# Patient Record
Sex: FEMALE | Race: OTHER | HISPANIC OR LATINO | URBAN - METROPOLITAN AREA
[De-identification: names, ages, dates, MRNs, and addresses within clinical notes are randomized per-mention and may not be internally consistent; named-entity substitution may affect disease eponyms.]

---

## 2017-02-24 ENCOUNTER — OUTPATIENT (OUTPATIENT)
Dept: OUTPATIENT SERVICES | Facility: HOSPITAL | Age: 42
LOS: 1 days | End: 2017-02-24
Payer: MEDICAID

## 2017-02-24 VITALS
HEIGHT: 63 IN | OXYGEN SATURATION: 100 % | WEIGHT: 123.9 LBS | RESPIRATION RATE: 17 BRPM | HEART RATE: 70 BPM | TEMPERATURE: 98 F | SYSTOLIC BLOOD PRESSURE: 112 MMHG | DIASTOLIC BLOOD PRESSURE: 67 MMHG

## 2017-02-24 DIAGNOSIS — D36.7 BENIGN NEOPLASM OF OTHER SPECIFIED SITES: ICD-10-CM

## 2017-02-24 DIAGNOSIS — Z01.818 ENCOUNTER FOR OTHER PREPROCEDURAL EXAMINATION: ICD-10-CM

## 2017-02-24 DIAGNOSIS — Z98.890 OTHER SPECIFIED POSTPROCEDURAL STATES: Chronic | ICD-10-CM

## 2017-02-24 LAB — HCG SERPL-ACNC: <1 MIU/ML — SIGNIFICANT CHANGE UP

## 2017-02-24 PROCEDURE — 86803 HEPATITIS C AB TEST: CPT

## 2017-02-24 PROCEDURE — 84702 CHORIONIC GONADOTROPIN TEST: CPT

## 2017-02-24 PROCEDURE — G0463: CPT

## 2017-02-24 PROCEDURE — 87389 HIV-1 AG W/HIV-1&-2 AB AG IA: CPT

## 2017-02-24 RX ORDER — SODIUM CHLORIDE 9 MG/ML
3 INJECTION INTRAMUSCULAR; INTRAVENOUS; SUBCUTANEOUS EVERY 8 HOURS
Qty: 0 | Refills: 0 | Status: DISCONTINUED | OUTPATIENT
Start: 2017-02-27 | End: 2017-03-07

## 2017-02-24 NOTE — H&P PST ADULT - NSANTHOSAYNRD_GEN_A_CORE
No. ZOE screening performed.  STOP BANG Legend: 0-2 = LOW Risk; 3-4 = INTERMEDIATE Risk; 5-8 = HIGH Risk

## 2017-02-24 NOTE — H&P PST ADULT - HISTORY OF PRESENT ILLNESS
41year old female with pmhx of fibroids and bladder infection presents today with c/o painless mass to right shoulder. Patient is here today for presurgical testing for scheduled excision of right posterior shoulder mass on 2/27/17

## 2017-02-24 NOTE — H&P PST ADULT - FAMILY HISTORY
Father  Still living? Unknown  Family history of prostate cancer, Age at diagnosis: Age Unknown     Mother  Still living? Unknown  Family history of diabetes mellitus, Age at diagnosis: Age Unknown

## 2017-02-25 LAB
HCV AB S/CO SERPL IA: 0.15 S/CO — SIGNIFICANT CHANGE UP
HCV AB SERPL-IMP: SIGNIFICANT CHANGE UP
HIV 1+2 AB+HIV1 P24 AG SERPL QL IA: SIGNIFICANT CHANGE UP

## 2017-02-26 ENCOUNTER — RESULT REVIEW (OUTPATIENT)
Age: 42
End: 2017-02-26

## 2017-02-27 ENCOUNTER — OUTPATIENT (OUTPATIENT)
Dept: OUTPATIENT SERVICES | Facility: HOSPITAL | Age: 42
LOS: 1 days | Discharge: HOME | End: 2017-02-27
Payer: MEDICAID

## 2017-02-27 ENCOUNTER — TRANSCRIPTION ENCOUNTER (OUTPATIENT)
Age: 42
End: 2017-02-27

## 2017-02-27 VITALS
SYSTOLIC BLOOD PRESSURE: 116 MMHG | OXYGEN SATURATION: 100 % | WEIGHT: 123.9 LBS | RESPIRATION RATE: 14 BRPM | HEIGHT: 63 IN | HEART RATE: 63 BPM | DIASTOLIC BLOOD PRESSURE: 72 MMHG | TEMPERATURE: 98 F

## 2017-02-27 VITALS
RESPIRATION RATE: 14 BRPM | TEMPERATURE: 98 F | OXYGEN SATURATION: 100 % | DIASTOLIC BLOOD PRESSURE: 47 MMHG | SYSTOLIC BLOOD PRESSURE: 97 MMHG | HEART RATE: 67 BPM

## 2017-02-27 DIAGNOSIS — D17.79 BENIGN LIPOMATOUS NEOPLASM OF OTHER SITES: ICD-10-CM

## 2017-02-27 DIAGNOSIS — D25.9 LEIOMYOMA OF UTERUS, UNSPECIFIED: ICD-10-CM

## 2017-02-27 DIAGNOSIS — D36.7 BENIGN NEOPLASM OF OTHER SPECIFIED SITES: ICD-10-CM

## 2017-02-27 DIAGNOSIS — Z01.818 ENCOUNTER FOR OTHER PREPROCEDURAL EXAMINATION: ICD-10-CM

## 2017-02-27 DIAGNOSIS — Z98.890 OTHER SPECIFIED POSTPROCEDURAL STATES: Chronic | ICD-10-CM

## 2017-02-27 PROCEDURE — 11404 EXC TR-EXT B9+MARG 3.1-4 CM: CPT

## 2017-02-27 PROCEDURE — 23071 EXC SHOULDER LES SC 3 CM/>: CPT | Mod: AS,RT

## 2017-02-27 PROCEDURE — 88304 TISSUE EXAM BY PATHOLOGIST: CPT | Mod: 26

## 2017-02-27 PROCEDURE — 12032 INTMD RPR S/A/T/EXT 2.6-7.5: CPT

## 2017-02-27 PROCEDURE — 88304 TISSUE EXAM BY PATHOLOGIST: CPT

## 2017-02-27 RX ORDER — SODIUM CHLORIDE 9 MG/ML
1000 INJECTION, SOLUTION INTRAVENOUS
Qty: 0 | Refills: 0 | Status: DISCONTINUED | OUTPATIENT
Start: 2017-02-27 | End: 2017-02-27

## 2017-02-27 RX ORDER — ACETAMINOPHEN WITH CODEINE 300MG-30MG
1 TABLET ORAL EVERY 4 HOURS
Qty: 0 | Refills: 0 | Status: DISCONTINUED | OUTPATIENT
Start: 2017-02-27 | End: 2017-02-27

## 2017-02-27 RX ORDER — FENTANYL CITRATE 50 UG/ML
25 INJECTION INTRAVENOUS
Qty: 0 | Refills: 0 | Status: DISCONTINUED | OUTPATIENT
Start: 2017-02-27 | End: 2017-02-27

## 2017-02-27 RX ORDER — ACETAMINOPHEN WITH CODEINE 300MG-30MG
1 TABLET ORAL
Qty: 5 | Refills: 0 | OUTPATIENT
Start: 2017-02-27

## 2017-02-27 RX ADMIN — SODIUM CHLORIDE 125 MILLILITER(S): 9 INJECTION, SOLUTION INTRAVENOUS at 09:24

## 2017-02-27 RX ADMIN — SODIUM CHLORIDE 3 MILLILITER(S): 9 INJECTION INTRAMUSCULAR; INTRAVENOUS; SUBCUTANEOUS at 07:31

## 2017-02-27 NOTE — ASU DISCHARGE PLAN (ADULT/PEDIATRIC). - MEDICATION SUMMARY - MEDICATIONS TO TAKE
I will START or STAY ON the medications listed below when I get home from the hospital:    acetaminophen-codeine 300 mg-15 mg oral tablet  -- 1 tab(s) by mouth every 6 hours MDD:4 tabs prn pain  -- Caution federal law prohibits the transfer of this drug to any person other  than the person for whom it was prescribed.  May cause drowsiness.  Alcohol may intensify this effect.  Use care when operating dangerous machinery.  This product contains acetaminophen.  Do not use  with any other product containing acetaminophen to prevent possible liver damage.  Using more of this medication than prescribed may cause serious breathing problems.    -- Indication: For pain

## 2017-03-13 LAB — SURGICAL PATHOLOGY FINAL REPORT - CH: SIGNIFICANT CHANGE UP

## 2017-04-17 ENCOUNTER — APPOINTMENT (OUTPATIENT)
Dept: SURGERY | Facility: CLINIC | Age: 42
End: 2017-04-17

## 2017-04-17 VITALS
HEIGHT: 64 IN | SYSTOLIC BLOOD PRESSURE: 102 MMHG | BODY MASS INDEX: 21.51 KG/M2 | DIASTOLIC BLOOD PRESSURE: 62 MMHG | WEIGHT: 126 LBS

## 2021-03-09 NOTE — H&P PST ADULT - NSANTHGENDERRD_ENT_A_CORE
Danni Deleon : 1947 [place] Secondary[place] [employer] [address] [facility] [address] [phone]   [phone] OUTPATIENT PHYSICAL THERAPY:Initial Assessment 3/9/2021 ICD-10: Treatment Diagnosis: right hip pain (M25.551); Trochanteric bursitis, right hip (M70.61) Precautions/Allergies:  
Epinephrine TREATMENT PLAN: 
Effective Dates: 3/9/2021 TO 2021 (60 days). Frequency/Duration: 2 times a week for 60 Day(s) MEDICAL/REFERRING DIAGNOSIS: 
Right hip pain [M25.551] Tendinitis [M77.9] DATE OF ONSET: chronic with worsening over last couple months REFERRING PHYSICIAN: Jenniffer Carpenter MD MD Orders: evaluate and treat Return MD Appointment: 21 INITIAL ASSESSMENT:  Ms. Michael Mayo is a 68year old female with right hip and buttock pain that is chronic in nature, but worsened a few months ago with no specific mechanism or injury. She is previously known to PT for treatment of similar issue summer 2020. She reports significant improvement in pain since an injection 1 week ago, but still some weakness and occasional discomfort. She presents to PT with decreased mobility in hip musculature, decreased hip strength, decreased lumbar mobility, and altered recruitment strategies for hip abduction. She presents to PT with overall improvement in pain and has been treated for similar diagnosis successfully with physical therapy. Her plan of care may decrease frequency as a result after initial management. PROBLEM LIST (Impacting functional limitations): 1. Decreased Strength 2. Decreased ADL/Functional Activities 3. Increased Pain 4. Decreased Activity Tolerance 5. Decreased Flexibility/Joint Mobility 6. Decreased [place] with Home Exercise Program INTERVENTIONS PLANNED: (Treatment may consist of any combination of the following) 1. Electrical Stimulation 2. Heat 3. Home Exercise Program (HEP) 4. Manual Therapy 5. Neuromuscular Re-education/Strengthening 6. Range of Motion (ROM) 7. Therapeutic Activites 8. Therapeutic Exercise/Strengthening GOALS: (Goals have been discussed and agreed upon with patient.) Discharge Goals: Time Frame: as of 5/8/21 1. Pt will report no discomfort with getting into/out of the car. 2. Pt will report no limitation with ascending/descending a flight of stairs. 3. Pt will report no pain or limitation with resuming exercises at gym. 4. Pt will demonstrate improvement in function with LEFS to 61/80 or greater. OUTCOME MEASURE:  
Tool Used: Lower Extremity Functional Scale (LEFS) Score:  Initial: 51/80 Most Recent: X/80 (Date: -- ) Interpretation of Score: 20 questions each scored on a 5 point scale with 0 representing \"extreme difficulty or unable to perform\" and 4 representing \"no difficulty\". The lower the score, the greater the functional disability. 80/80 represents no disability. Minimal detectable change is 9 points. MEDICAL NECESSITY:  
· Patient is expected to demonstrate progress in strength and range of motion to increase independence with getting into car and stair ambulation without pain. REASON FOR SERVICES/OTHER COMMENTS: 
· Patient continues to require present interventions due to patient's inability to perform normal daily activities without discomfort. [place]  Total Duration: PT Patient Time In/Time Out Time In: 1030 Time Out: 1130 Rehabilitation Potential For Stated Goals: Good Regarding [employer] therapy, I certify that the treatment plan above will be carried out by a therapist or under their direction. Thank you for this referral, Dina Paul PT Referring Physician Signature: [facility] MD _______________________________ Date _____________ PAIN/SUBJECTIVE:  
Initial: Pain Intensity 1: 0  Post Session:  0/10 HISTORY:  
History of Injury/Illness (Reason for Referral): 
Pt reports right hip and buttock pain that is chronic in nature, but worsened a few months ago with no specific mechanism or injury. She is previously known to PT for treatment of similar issue summer 2020. She reports significant improvement in pain since an injection 1 week ago, but still some weakness and occasional discomfort with certain movements including getting into and out of the the car, going up and down stairs, and exercising. Past Medical History/Comorbidities: Ms. Valeri Lobo  has a past medical history of Anxiety, Arthritis, GERD (gastroesophageal reflux disease), Hiatal hernia, HTN (hypertension), Hypercholesteremia, Seasonal allergic rhinitis, and Thyroid cancer ([facility] [address] Ms. Valeri Lobo  has a past surgical history that includes hx breast biopsy; us guided core breast biopsy; hx thyroidectomy; hx orthopaedic; hx knee arthroscopy (Bilateral); hx bunionectomy (Bilateral); hx partial hysterectomy; and hx knee replacement (Right, 04/2018). Covid-19 diagnosis 1/23/21. Social History/Living Environment:  
  Pt lives in a one story home with her spouse. Prior Level of Function/Work/Activity: 
Pt reports no difficulty with getting into and out of the car or stair ambulation prior to onset of this pain. Previous Treatment Approaches: PT with improved symptoms. Ambulatory/Rehab Services H2 Model Falls Risk Assessment Risk Factors: 
     No Risk Factors Identified Ability to Rise from Chair: 
     (1)  Pushes up, successful in one attempt Falls Prevention Plan: No modifications necessary Total: (5 or greater = High Risk): 1 ©2010 [facility] [address] Patent #[id number]. Federal Law prohibits the replication, distribution or use without written permission from [facility] [employer] Current Medications:   
  
Current Outpatient Medications:  
  buPROPion (WELLBUTRIN) 100 mg tablet, Take  by mouth., Disp: , Rfl:  
  magnesium oxide (MAG-OX) 400 mg tablet, Take 400 mg by mouth daily. , Disp: , Rfl:  
  cholecalciferol, vitamin D3, (VITAMIN D3) 2,000 unit tab, Take 1 Tab by mouth daily. , Disp: , Rfl:  
  atorvastatin (LIPITOR) 20 mg tablet, Take 20 mg by mouth nightly., Disp: , Rfl:  
  loratadine 10 mg cap, Take 10 mg by mouth daily. , Disp: , Rfl:  
  lisinopril (PRINIVIL, ZESTRIL) 20 mg tablet, Take 20 mg by mouth nightly., Disp: , Rfl:  
  pantoprazole (PROTONIX) 40 mg tablet, Take 40 mg by mouth nightly., Disp: , Rfl:  
  bisoprolol-hydroCHLOROthiazide (ZIAC) 2.5-6.25 mg per tablet, Take 1 Tab by mouth nightly., Disp: , Rfl:  
  montelukast (SINGULAIR) 10 mg tablet, Take 10 mg by mouth nightly., Disp: , Rfl:  
  levothyroxine (SYNTHROID) 175 mcg tablet, Take 175 mcg by mouth Daily (before breakfast). , Disp: , Rfl:  
  escitalopram oxalate (LEXAPRO) 20 mg tablet, Take 20 mg by mouth daily. , Disp: , Rfl:  
  cyanocobalamin (VITAMIN B12) 1,000 mcg/mL injection, 1,000 mcg by IntraMUSCular route every twenty-eight (28) days. , Disp: , Rfl:   
Date Last Reviewed:  3/9/2021 Number of Personal Factors/Comorbidities that affect the Plan of Care: 1-2: MODERATE COMPLEXITY EXAMINATION:  
Observation/Orthostatic Postural Assessment: No trendelenberg with gait or single leg balance Decreased hamstring and hip flexor mobility Palpation:   
      TTP right gluteals, TFL, and greater trochanter ROM:   
     WNL flexion and ER,  IR ~10 degrees Strength:   
       
 Manual Muscle Test out of 5 Hip Flexion L: 3+, R: 3 Hip Abduction L: 3, R: 2 Hip Extension Not tested Knee Extension B 4+ Knee Flexion B 4+ Special Tests:   
      Negative [place], SLR, slmalgorzata Functional Mobility:  
      Gait/Ambulation:  Normal  
      Stairs:  Reciprocal, pain reported Balance:   
      10 seconds each side Body Structures Involved: 1. Nerves 2. Joints 3. Muscles Body Functions Affected: 1. Sensory/Pain 2. Neuromusculoskeletal 
3. Movement Related Activities and Participation Affected: 1. General Tasks and Demands 2. Mobility 3. Domestic Life 4. Interpersonal Interactions and Relationships 5. Community, Social and [place] Number of elements (examined above) that affect the Plan of Care: 4+: HIGH COMPLEXITY CLINICAL PRESENTATION:  
Presentation: Evolving clinical presentation with changing clinical characteristics: MODERATE COMPLEXITY CLINICAL DECISION MAKING:  
Use of outcome tool(s) and clinical judgement create a POC that gives a: Clear prediction of patient's progress: LOW COMPLEXITY No

## 2022-05-19 NOTE — H&P PST ADULT - HEIGHT IN CM
Minocycline Counseling: Patient advised regarding possible photosensitivity and discoloration of the teeth, skin, lips, tongue and gums.  Patient instructed to avoid sunlight, if possible.  When exposed to sunlight, patients should wear protective clothing, sunglasses, and sunscreen.  The patient was instructed to call the office immediately if the following severe adverse effects occur:  hearing changes, easy bruising/bleeding, severe headache, or vision changes.  The patient verbalized understanding of the proper use and possible adverse effects of minocycline.  All of the patient's questions and concerns were addressed. 160.02

## 2024-11-06 NOTE — ASU PREOP CHECKLIST - SKIN PREP
, the nephrologist at [facility] is requesting lab work and Echocardiogram  from [facility].  Will put in patient update information Patient family is at the bedside and gave updated information on patient    done